# Patient Record
Sex: FEMALE | Race: WHITE | NOT HISPANIC OR LATINO | Employment: OTHER
[De-identification: names, ages, dates, MRNs, and addresses within clinical notes are randomized per-mention and may not be internally consistent; named-entity substitution may affect disease eponyms.]

---

## 2019-10-02 ENCOUNTER — HEALTH MAINTENANCE LETTER (OUTPATIENT)
Age: 74
End: 2019-10-02

## 2019-12-15 ENCOUNTER — HEALTH MAINTENANCE LETTER (OUTPATIENT)
Age: 74
End: 2019-12-15

## 2021-01-15 ENCOUNTER — HEALTH MAINTENANCE LETTER (OUTPATIENT)
Age: 76
End: 2021-01-15

## 2021-09-04 ENCOUNTER — HEALTH MAINTENANCE LETTER (OUTPATIENT)
Age: 76
End: 2021-09-04

## 2021-10-30 ENCOUNTER — HEALTH MAINTENANCE LETTER (OUTPATIENT)
Age: 76
End: 2021-10-30

## 2022-02-19 ENCOUNTER — HEALTH MAINTENANCE LETTER (OUTPATIENT)
Age: 77
End: 2022-02-19

## 2022-10-22 ENCOUNTER — HEALTH MAINTENANCE LETTER (OUTPATIENT)
Age: 77
End: 2022-10-22

## 2023-04-01 ENCOUNTER — HEALTH MAINTENANCE LETTER (OUTPATIENT)
Age: 78
End: 2023-04-01

## 2023-06-18 ENCOUNTER — ANCILLARY PROCEDURE (OUTPATIENT)
Dept: GENERAL RADIOLOGY | Facility: CLINIC | Age: 78
End: 2023-06-18
Attending: PHYSICIAN ASSISTANT

## 2023-06-18 ENCOUNTER — OFFICE VISIT (OUTPATIENT)
Dept: FAMILY MEDICINE | Facility: CLINIC | Age: 78
End: 2023-06-18

## 2023-06-18 VITALS
HEART RATE: 71 BPM | OXYGEN SATURATION: 98 % | RESPIRATION RATE: 16 BRPM | WEIGHT: 185 LBS | SYSTOLIC BLOOD PRESSURE: 132 MMHG | TEMPERATURE: 97 F | DIASTOLIC BLOOD PRESSURE: 85 MMHG

## 2023-06-18 DIAGNOSIS — J20.9 ACUTE BRONCHITIS WITH WHEEZING: Primary | ICD-10-CM

## 2023-06-18 PROCEDURE — 71046 X-RAY EXAM CHEST 2 VIEWS: CPT | Mod: TC | Performed by: RADIOLOGY

## 2023-06-18 PROCEDURE — 99203 OFFICE O/P NEW LOW 30 MIN: CPT

## 2023-06-18 RX ORDER — ROSUVASTATIN CALCIUM 20 MG/1
20 TABLET, COATED ORAL DAILY
COMMUNITY
Start: 2023-02-07

## 2023-06-18 RX ORDER — BENZONATATE 100 MG/1
100 CAPSULE ORAL 3 TIMES DAILY PRN
Qty: 30 CAPSULE | Refills: 0 | Status: SHIPPED | OUTPATIENT
Start: 2023-06-18 | End: 2023-06-28

## 2023-06-18 RX ORDER — ALBUTEROL SULFATE 90 UG/1
2 AEROSOL, METERED RESPIRATORY (INHALATION) EVERY 6 HOURS PRN
Qty: 18 G | Refills: 0 | Status: SHIPPED | OUTPATIENT
Start: 2023-06-18

## 2023-06-18 RX ORDER — UBIDECARENONE 100 MG
100 CAPSULE ORAL DAILY
COMMUNITY

## 2023-06-18 RX ORDER — B-COMPLEX WITH VITAMIN C
1 TABLET ORAL
COMMUNITY

## 2023-06-18 RX ORDER — ASPIRIN 81 MG/1
81 TABLET, CHEWABLE ORAL DAILY
COMMUNITY

## 2023-06-18 RX ORDER — CARVEDILOL 3.12 MG/1
3.12 TABLET ORAL
COMMUNITY
Start: 2022-07-19

## 2023-06-18 ASSESSMENT — ENCOUNTER SYMPTOMS
ACTIVITY CHANGE: 0
SINUS PAIN: 0
RHINORRHEA: 0
FEVER: 0
SINUS PRESSURE: 0
WHEEZING: 1
CHEST TIGHTNESS: 1
SHORTNESS OF BREATH: 1
CHILLS: 0
COUGH: 1

## 2023-06-18 NOTE — PROGRESS NOTES
Assessment & Plan      1. Acute bronchitis with wheezing  -Clinical presentation of a viral infection.  Mild wheezing in the upper lobes bilaterally.  No rhonchi or rales.  Chest x-ray is negative for pneumonia per radiology report.  Patient advised to use albuterol inhaler as needed for wheezing.  Patient has used the albuterol inhaler in the past.  She is advised to use the antitussive as needed for cough  - XR Chest 2 Views  - albuterol (PROAIR HFA/PROVENTIL HFA/VENTOLIN HFA) 108 (90 Base) MCG/ACT inhaler; Inhale 2 puffs into the lungs every 6 hours as needed for shortness of breath, wheezing or cough  Dispense: 18 g; Refill: 0  - benzonatate (TESSALON) 100 MG capsule; Take 1 capsule (100 mg) by mouth 3 times daily as needed for cough  Dispense: 30 capsule; Refill: 0    Results for orders placed or performed in visit on 06/18/23   XR Chest 2 Views     Status: None    Narrative    EXAM: XR CHEST 2 VIEWS  LOCATION: [facility]  DATE: 6/18/2023    INDICATION:  Acute cough  COMPARISON: None.      Impression    IMPRESSION: Negative chest.       Patient Instructions   Acute bronchitis is usually caused by viruses  Inappropriate use of antibiotics for viral infections may lead to adverse reactions and also contribute to development of antibiotic resistance  Use analgesics such as acetaminophen for headaches and muscle pain  You may cough for weeks, hot teas and honey may help          Return if symptoms worsen or fail to improve, for Follow up.    At the end of the encounter, I discussed results, diagnosis, medications. Discussed red flags for immediate return to clinic/ER, as well as indications for follow up if no improvement. Patient understood and agreed to plan. Patient was stable for discharge.    Jeni Palacio is a 77 year old female who presents to clinic today for the following health issues:  Chief Complaint   Patient presents with     Cough     Productive cough for a  week and wheezing.      HPI    Patient reports cough for one week. She notes cough worsening for the past 2 days. Cough is productive, worse at night and is also constant throughout the day. She reports wheezing, shortness of  breath, chest tightness. She denies cold symptoms, runny nose, congestion, sinus pain or pressure. She denies fever and chills. She does not have a history of Asthma or allergies. She has been using the throat spray and a nasal swab zicam.     Review of Systems   Constitutional: Negative for activity change, chills and fever.   HENT: Negative for congestion, rhinorrhea, sinus pressure and sinus pain.    Respiratory: Positive for cough, chest tightness, shortness of breath and wheezing.    Cardiovascular: Negative for chest pain.       Problem List:  2005: Rosacea      Past Medical History:   Diagnosis Date     Rosacea        Social History     Tobacco Use     Smoking status: Former     Types: Cigarettes     Quit date: 1975     Years since quittin.4     Smokeless tobacco: Never   Vaping Use     Vaping status: Not on file   Substance Use Topics     Alcohol use: Yes     Comment: occ           Objective    /85 (BP Location: Left arm, Patient Position: Sitting, Cuff Size: Adult Large)   Pulse 71   Temp 97  F (36.1  C) (Tympanic)   Resp 16   Wt 83.9 kg (185 lb)   SpO2 98%   Physical Exam  Constitutional:       Appearance: Normal appearance.   HENT:      Head: Normocephalic.      Right Ear: Tympanic membrane normal.      Left Ear: Tympanic membrane normal.      Mouth/Throat:      Mouth: Mucous membranes are moist.      Pharynx: Oropharynx is clear. Uvula midline. No posterior oropharyngeal erythema.   Cardiovascular:      Rate and Rhythm: Normal rate and regular rhythm.   Pulmonary:      Effort: Pulmonary effort is normal.      Breath sounds: Examination of the right-upper field reveals wheezing. Examination of the left-upper field reveals wheezing. Wheezing present.    Lymphadenopathy:      Head:      Right side of head: No submental, submandibular or tonsillar adenopathy.      Left side of head: No submental, submandibular or tonsillar adenopathy.      Cervical: No cervical adenopathy.      Right cervical: No superficial cervical adenopathy.     Left cervical: No superficial cervical adenopathy.   Skin:     General: Skin is warm and dry.      Findings: No rash.   Neurological:      Mental Status: She is alert.   Psychiatric:         Mood and Affect: Mood normal.         Behavior: Behavior normal.              Delisa Jung PA-C

## 2023-06-18 NOTE — PATIENT INSTRUCTIONS
Acute bronchitis is usually caused by viruses  Inappropriate use of antibiotics for viral infections may lead to adverse reactions and also contribute to development of antibiotic resistance  Use analgesics such as acetaminophen for headaches and muscle pain  You may cough for weeks, hot teas and honey may help

## 2024-06-02 ENCOUNTER — HEALTH MAINTENANCE LETTER (OUTPATIENT)
Age: 79
End: 2024-06-02

## 2025-06-14 ENCOUNTER — HEALTH MAINTENANCE LETTER (OUTPATIENT)
Age: 80
End: 2025-06-14